# Patient Record
Sex: FEMALE | Race: BLACK OR AFRICAN AMERICAN | NOT HISPANIC OR LATINO | Employment: UNEMPLOYED | URBAN - NONMETROPOLITAN AREA
[De-identification: names, ages, dates, MRNs, and addresses within clinical notes are randomized per-mention and may not be internally consistent; named-entity substitution may affect disease eponyms.]

---
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
## 2023-02-27 ENCOUNTER — TRANSCRIBE ORDERS (OUTPATIENT)
Dept: ADMINISTRATIVE | Facility: HOSPITAL | Age: 18
End: 2023-02-27
Payer: COMMERCIAL

## 2023-02-27 DIAGNOSIS — C71.9 PILOCYTIC ASTROCYTOMA: Primary | ICD-10-CM

## 2023-03-02 ENCOUNTER — REFERRAL TRIAGE (OUTPATIENT)
Dept: LABOR AND DELIVERY | Facility: HOSPITAL | Age: 18
End: 2023-03-02
Payer: COMMERCIAL

## 2023-03-02 ENCOUNTER — INITIAL PRENATAL (OUTPATIENT)
Dept: OBSTETRICS AND GYNECOLOGY | Facility: CLINIC | Age: 18
End: 2023-03-02
Payer: COMMERCIAL

## 2023-03-02 ENCOUNTER — PATIENT OUTREACH (OUTPATIENT)
Dept: LABOR AND DELIVERY | Facility: HOSPITAL | Age: 18
End: 2023-03-02
Payer: COMMERCIAL

## 2023-03-02 DIAGNOSIS — Z78.9 NON-SMOKER: ICD-10-CM

## 2023-03-02 DIAGNOSIS — M54.9 BACK PAIN IN PREGNANCY: ICD-10-CM

## 2023-03-02 DIAGNOSIS — O26.899 PREGNANCY RELATED HIP PAIN, ANTEPARTUM: ICD-10-CM

## 2023-03-02 DIAGNOSIS — M25.559 PREGNANCY RELATED HIP PAIN, ANTEPARTUM: ICD-10-CM

## 2023-03-02 DIAGNOSIS — Z71.85 IMMUNIZATION COUNSELING: ICD-10-CM

## 2023-03-02 DIAGNOSIS — Z3A.23 23 WEEKS GESTATION OF PREGNANCY: ICD-10-CM

## 2023-03-02 DIAGNOSIS — O99.891 BACK PAIN IN PREGNANCY: ICD-10-CM

## 2023-03-02 DIAGNOSIS — G40.909 SEIZURE DISORDER DURING PREGNANCY, ANTEPARTUM: ICD-10-CM

## 2023-03-02 DIAGNOSIS — Z23 INFLUENZA VACCINE NEEDED: ICD-10-CM

## 2023-03-02 DIAGNOSIS — Z34.82 PRENATAL CARE, SUBSEQUENT PREGNANCY, SECOND TRIMESTER: Primary | ICD-10-CM

## 2023-03-02 DIAGNOSIS — O99.350 SEIZURE DISORDER DURING PREGNANCY, ANTEPARTUM: ICD-10-CM
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
## 2023-03-02 PROBLEM — Z92.21 PERSONAL HISTORY OF CHEMOTHERAPY: Status: ACTIVE | Noted: 2022-12-06

## 2023-03-02 PROBLEM — I67.89 RADIATION THERAPY INDUCED BRAIN NECROSIS: Status: ACTIVE | Noted: 2022-07-31

## 2023-03-02 LAB
GLUCOSE UR STRIP-MCNC: NEGATIVE MG/DL
PROT UR STRIP-MCNC: NEGATIVE MG/DL

## 2023-03-02 PROCEDURE — 90471 IMMUNIZATION ADMIN: CPT | Performed by: ADVANCED PRACTICE MIDWIFE

## 2023-03-02 PROCEDURE — 90686 IIV4 VACC NO PRSV 0.5 ML IM: CPT | Performed by: ADVANCED PRACTICE MIDWIFE

## 2023-03-02 PROCEDURE — 99204 OFFICE O/P NEW MOD 45 MIN: CPT | Performed by: ADVANCED PRACTICE MIDWIFE

## 2023-03-02 RX ORDER — LEVETIRACETAM 1000 MG/1
TABLET ORAL
COMMUNITY
Start: 2022-11-11

## 2023-03-02 RX ORDER — CLONAZEPAM 0.5 MG/1
TABLET ORAL AS NEEDED
COMMUNITY
Start: 2022-11-14

## 2023-03-02 RX ORDER — DIAZEPAM 20 MG/4ML
GEL RECTAL
COMMUNITY
Start: 2023-02-16

## 2023-03-02 RX ORDER — NALOXONE HYDROCHLORIDE 4 MG/.1ML
SPRAY NASAL
COMMUNITY
Start: 2022-08-18 | End: 2023-08-19

## 2023-03-02 RX ORDER — LEVETIRACETAM 500 MG/1
TABLET ORAL
COMMUNITY
Start: 2022-11-16

## 2023-03-02 RX ORDER — DIAZEPAM 7.5 MG/100UL
SPRAY NASAL
COMMUNITY
Start: 2023-02-17

## 2023-03-02 RX ORDER — OXCARBAZEPINE 600 MG/1
TABLET, FILM COATED ORAL
COMMUNITY
Start: 2022-08-30

## 2023-03-06 VITALS
SYSTOLIC BLOOD PRESSURE: 122 MMHG | WEIGHT: 184 LBS | HEIGHT: 64 IN | BODY MASS INDEX: 31.41 KG/M2 | DIASTOLIC BLOOD PRESSURE: 64 MMHG

## 2023-03-06 PROBLEM — Z34.90 PREGNANCY: Status: ACTIVE | Noted: 2023-03-06

## 2023-03-08 LAB
BACTERIA UR CULT: NORMAL
BACTERIA UR CULT: NORMAL
C TRACH RRNA SPEC QL NAA+PROBE: NEGATIVE
DRUGS UR: NORMAL
N GONORRHOEA RRNA SPEC QL NAA+PROBE: NEGATIVE

## 2023-03-27 ENCOUNTER — PATIENT OUTREACH (OUTPATIENT)
Dept: LABOR AND DELIVERY | Facility: HOSPITAL | Age: 18
End: 2023-03-27
Payer: COMMERCIAL

## 2023-03-29 ENCOUNTER — ROUTINE PRENATAL (OUTPATIENT)
Dept: OBSTETRICS AND GYNECOLOGY | Facility: CLINIC | Age: 18
End: 2023-03-29
Payer: COMMERCIAL

## 2023-03-29 VITALS — WEIGHT: 189 LBS | DIASTOLIC BLOOD PRESSURE: 70 MMHG | SYSTOLIC BLOOD PRESSURE: 120 MMHG | BODY MASS INDEX: 32.44 KG/M2

## 2023-03-29 DIAGNOSIS — Z34.82 MULTIGRAVIDA IN SECOND TRIMESTER: Primary | ICD-10-CM

## 2023-03-29 DIAGNOSIS — Z13.0 SCREENING FOR IRON DEFICIENCY ANEMIA: ICD-10-CM

## 2023-03-29 DIAGNOSIS — Z23 NEED FOR TDAP VACCINATION: ICD-10-CM

## 2023-03-29 DIAGNOSIS — Z13.1 SCREENING FOR DIABETES MELLITUS (DM): ICD-10-CM

## 2023-03-29 DIAGNOSIS — Z3A.27 27 WEEKS GESTATION OF PREGNANCY: ICD-10-CM

## 2023-03-29 LAB
GLUCOSE UR STRIP-MCNC: NEGATIVE MG/DL
PROT UR STRIP-MCNC: ABNORMAL MG/DL

## 2023-03-29 PROCEDURE — 90471 IMMUNIZATION ADMIN: CPT | Performed by: ADVANCED PRACTICE MIDWIFE

## 2023-03-29 PROCEDURE — 99214 OFFICE O/P EST MOD 30 MIN: CPT | Performed by: ADVANCED PRACTICE MIDWIFE

## 2023-03-29 PROCEDURE — 90715 TDAP VACCINE 7 YRS/> IM: CPT | Performed by: ADVANCED PRACTICE MIDWIFE

## 2023-03-30 LAB
GLUCOSE 1H P 50 G GLC PO SERPL-MCNC: 114 MG/DL (ref 65–139)
HGB BLD-MCNC: 10.8 G/DL (ref 12–15.9)

## 2023-04-11 ENCOUNTER — HOSPITAL ENCOUNTER (OUTPATIENT)
Dept: MRI IMAGING | Facility: HOSPITAL | Age: 18
Discharge: HOME | End: 2023-04-11
Admitting: PEDIATRICS
Payer: COMMERCIAL

## 2023-04-11 DIAGNOSIS — C71.9 PILOCYTIC ASTROCYTOMA: ICD-10-CM

## 2023-04-11 PROCEDURE — 70551 MRI BRAIN STEM W/O DYE: CPT

## 2023-04-18 ENCOUNTER — ROUTINE PRENATAL (OUTPATIENT)
Dept: OBSTETRICS AND GYNECOLOGY | Facility: CLINIC | Age: 18
End: 2023-04-18
Payer: COMMERCIAL

## 2023-04-18 VITALS — BODY MASS INDEX: 31.78 KG/M2 | SYSTOLIC BLOOD PRESSURE: 114 MMHG | DIASTOLIC BLOOD PRESSURE: 68 MMHG | WEIGHT: 191 LBS

## 2023-04-18 DIAGNOSIS — N39.0 URINARY TRACT INFECTION WITHOUT HEMATURIA, SITE UNSPECIFIED: Primary | ICD-10-CM

## 2023-04-18 LAB
GLUCOSE UR STRIP-MCNC: NEGATIVE MG/DL
PROT UR STRIP-MCNC: NEGATIVE MG/DL

## 2023-04-18 PROCEDURE — 99213 OFFICE O/P EST LOW 20 MIN: CPT | Performed by: OBSTETRICS & GYNECOLOGY

## 2023-04-18 RX ORDER — NITROFURANTOIN 25; 75 MG/1; MG/1
100 CAPSULE ORAL DAILY
Qty: 30 CAPSULE | Refills: 2 | Status: SHIPPED | OUTPATIENT
Start: 2023-04-18

## 2023-04-18 RX ORDER — SULFAMETHOXAZOLE AND TRIMETHOPRIM 400; 80 MG/1; MG/1
1 TABLET ORAL 2 TIMES DAILY
Qty: 14 TABLET | Refills: 0 | Status: SHIPPED | OUTPATIENT
Start: 2023-04-18

## 2023-04-22 LAB
APPEARANCE UR: ABNORMAL
BACTERIA #/AREA URNS HPF: ABNORMAL /HPF
BACTERIA UR CULT: ABNORMAL
BACTERIA UR CULT: ABNORMAL
BILIRUB UR QL STRIP: NEGATIVE
CASTS URNS MICRO: ABNORMAL
COLOR UR: ABNORMAL
EPI CELLS #/AREA URNS HPF: ABNORMAL /HPF
GLUCOSE UR QL STRIP: NEGATIVE
HGB UR QL STRIP: NEGATIVE
KETONES UR QL STRIP: NEGATIVE
LEUKOCYTE ESTERASE UR QL STRIP: ABNORMAL
NITRITE UR QL STRIP: NEGATIVE
PH UR STRIP: 8 [PH] (ref 5–8)
PROT UR QL STRIP: ABNORMAL
RBC #/AREA URNS HPF: ABNORMAL /HPF
SP GR UR STRIP: 1.02 (ref 1–1.03)
UROBILINOGEN UR STRIP-MCNC: ABNORMAL MG/DL
WBC #/AREA URNS HPF: ABNORMAL /HPF

## 2023-05-03 ENCOUNTER — ROUTINE PRENATAL (OUTPATIENT)
Dept: OBSTETRICS AND GYNECOLOGY | Facility: CLINIC | Age: 18
End: 2023-05-03
Payer: COMMERCIAL

## 2023-05-03 VITALS — SYSTOLIC BLOOD PRESSURE: 124 MMHG | WEIGHT: 195 LBS | BODY MASS INDEX: 32.45 KG/M2 | DIASTOLIC BLOOD PRESSURE: 78 MMHG

## 2023-05-03 DIAGNOSIS — Z34.93 PRENATAL CARE IN THIRD TRIMESTER: Primary | ICD-10-CM

## 2023-05-03 LAB
GLUCOSE UR STRIP-MCNC: NEGATIVE MG/DL
PROT UR STRIP-MCNC: NEGATIVE MG/DL

## 2023-05-12 ENCOUNTER — HOSPITAL ENCOUNTER (OUTPATIENT)
Facility: HOSPITAL | Age: 18
Setting detail: OBSERVATION
Discharge: HOME | End: 2023-05-12
Attending: OBSTETRICS & GYNECOLOGY | Admitting: OBSTETRICS & GYNECOLOGY
Payer: COMMERCIAL

## 2023-05-12 VITALS
HEART RATE: 113 BPM | SYSTOLIC BLOOD PRESSURE: 103 MMHG | WEIGHT: 197.8 LBS | DIASTOLIC BLOOD PRESSURE: 50 MMHG | BODY MASS INDEX: 38.83 KG/M2 | RESPIRATION RATE: 16 BRPM | TEMPERATURE: 99.6 F | HEIGHT: 60 IN

## 2023-05-12 LAB
BACTERIA UR QL AUTO: ABNORMAL /HPF
BILIRUB UR QL STRIP: NEGATIVE
CLARITY UR: CLEAR
COLOR UR: ABNORMAL
GLUCOSE UR STRIP-MCNC: NEGATIVE MG/DL
HGB UR QL STRIP.AUTO: NEGATIVE
HYALINE CASTS UR QL AUTO: ABNORMAL /LPF
KETONES UR QL STRIP: ABNORMAL
LEUKOCYTE ESTERASE UR QL STRIP.AUTO: ABNORMAL
NITRITE UR QL STRIP: NEGATIVE
PH UR STRIP.AUTO: 6 [PH] (ref 5–8)
PROT UR QL STRIP: NEGATIVE
RBC # UR STRIP: ABNORMAL /HPF
REF LAB TEST METHOD: ABNORMAL
SP GR UR STRIP: 1.02 (ref 1–1.03)
SQUAMOUS #/AREA URNS HPF: ABNORMAL /HPF
UROBILINOGEN UR QL STRIP: ABNORMAL
WBC # UR STRIP: ABNORMAL /HPF

## 2023-05-12 PROCEDURE — G0463 HOSPITAL OUTPT CLINIC VISIT: HCPCS

## 2023-05-12 PROCEDURE — 81001 URINALYSIS AUTO W/SCOPE: CPT | Performed by: OBSTETRICS & GYNECOLOGY

## 2023-05-12 PROCEDURE — G0378 HOSPITAL OBSERVATION PER HR: HCPCS

## 2023-05-12 RX ORDER — SODIUM CHLORIDE 0.9 % (FLUSH) 0.9 %
10 SYRINGE (ML) INJECTION AS NEEDED
Status: DISCONTINUED | OUTPATIENT
Start: 2023-05-12 | End: 2023-05-12 | Stop reason: HOSPADM

## 2023-05-12 RX ORDER — NIFEDIPINE 10 MG/1
10 CAPSULE ORAL ONCE
Status: COMPLETED | OUTPATIENT
Start: 2023-05-12 | End: 2023-05-12

## 2023-05-12 RX ORDER — SODIUM CHLORIDE 0.9 % (FLUSH) 0.9 %
10 SYRINGE (ML) INJECTION EVERY 12 HOURS SCHEDULED
Status: DISCONTINUED | OUTPATIENT
Start: 2023-05-12 | End: 2023-05-12 | Stop reason: HOSPADM

## 2023-05-12 RX ADMIN — SODIUM CHLORIDE, POTASSIUM CHLORIDE, SODIUM LACTATE AND CALCIUM CHLORIDE 1000 ML: 600; 310; 30; 20 INJECTION, SOLUTION INTRAVENOUS at 03:20

## 2023-05-12 RX ADMIN — NIFEDIPINE 10 MG: 10 CAPSULE ORAL at 06:48

## 2023-05-13 ENCOUNTER — NURSE TRIAGE (OUTPATIENT)
Dept: CALL CENTER | Facility: HOSPITAL | Age: 18
End: 2023-05-13
Payer: COMMERCIAL

## 2023-05-17 ENCOUNTER — PATIENT OUTREACH (OUTPATIENT)
Dept: LABOR AND DELIVERY | Facility: HOSPITAL | Age: 18
End: 2023-05-17
Payer: COMMERCIAL

## 2023-05-18 ENCOUNTER — ROUTINE PRENATAL (OUTPATIENT)
Dept: OBSTETRICS AND GYNECOLOGY | Facility: CLINIC | Age: 18
End: 2023-05-18
Payer: COMMERCIAL

## 2023-05-18 VITALS — DIASTOLIC BLOOD PRESSURE: 74 MMHG | SYSTOLIC BLOOD PRESSURE: 112 MMHG | BODY MASS INDEX: 38.47 KG/M2 | WEIGHT: 197 LBS

## 2023-05-18 DIAGNOSIS — G40.909 SEIZURE DISORDER DURING PREGNANCY, ANTEPARTUM: ICD-10-CM

## 2023-05-18 DIAGNOSIS — O99.350 SEIZURE DISORDER DURING PREGNANCY, ANTEPARTUM: ICD-10-CM

## 2023-05-18 DIAGNOSIS — Z34.93 PRENATAL CARE IN THIRD TRIMESTER: Primary | ICD-10-CM

## 2023-05-18 LAB
GLUCOSE UR STRIP-MCNC: NEGATIVE MG/DL
PROT UR STRIP-MCNC: ABNORMAL MG/DL

## 2023-05-29 ENCOUNTER — PATIENT OUTREACH (OUTPATIENT)
Dept: LABOR AND DELIVERY | Facility: HOSPITAL | Age: 18
End: 2023-05-29

## 2023-05-31 ENCOUNTER — ROUTINE PRENATAL (OUTPATIENT)
Dept: OBSTETRICS AND GYNECOLOGY | Facility: CLINIC | Age: 18
End: 2023-05-31

## 2023-05-31 VITALS — DIASTOLIC BLOOD PRESSURE: 82 MMHG | BODY MASS INDEX: 39.65 KG/M2 | WEIGHT: 203 LBS | SYSTOLIC BLOOD PRESSURE: 122 MMHG

## 2023-05-31 DIAGNOSIS — G40.909 SEIZURE DISORDER DURING PREGNANCY, ANTEPARTUM: ICD-10-CM

## 2023-05-31 DIAGNOSIS — Z34.93 PRENATAL CARE IN THIRD TRIMESTER: Primary | ICD-10-CM

## 2023-05-31 DIAGNOSIS — O99.350 SEIZURE DISORDER DURING PREGNANCY, ANTEPARTUM: ICD-10-CM

## 2023-06-03 LAB
C TRACH RRNA SPEC QL NAA+PROBE: NEGATIVE
GP B STREP DNA SPEC QL NAA+PROBE: NEGATIVE
N GONORRHOEA RRNA SPEC QL NAA+PROBE: NEGATIVE

## 2023-06-06 ENCOUNTER — ROUTINE PRENATAL (OUTPATIENT)
Dept: OBSTETRICS AND GYNECOLOGY | Facility: CLINIC | Age: 18
End: 2023-06-06
Payer: COMMERCIAL

## 2023-06-06 VITALS — DIASTOLIC BLOOD PRESSURE: 82 MMHG | BODY MASS INDEX: 40.04 KG/M2 | SYSTOLIC BLOOD PRESSURE: 116 MMHG | WEIGHT: 205 LBS

## 2023-06-06 DIAGNOSIS — O99.350 SEIZURE DISORDER DURING PREGNANCY, ANTEPARTUM: ICD-10-CM

## 2023-06-06 DIAGNOSIS — Z3A.37 37 WEEKS GESTATION OF PREGNANCY: Primary | ICD-10-CM

## 2023-06-06 DIAGNOSIS — G40.909 SEIZURE DISORDER DURING PREGNANCY, ANTEPARTUM: ICD-10-CM

## 2023-06-06 LAB
GLUCOSE UR STRIP-MCNC: NEGATIVE MG/DL
PROT UR STRIP-MCNC: NEGATIVE MG/DL

## 2023-06-10 ENCOUNTER — HOSPITAL ENCOUNTER (OUTPATIENT)
Facility: HOSPITAL | Age: 18
Setting detail: OBSERVATION
Discharge: HOME | End: 2023-06-10
Attending: OBSTETRICS & GYNECOLOGY | Admitting: OBSTETRICS & GYNECOLOGY
Payer: COMMERCIAL

## 2023-06-10 VITALS
BODY MASS INDEX: 35.29 KG/M2 | WEIGHT: 211.8 LBS | DIASTOLIC BLOOD PRESSURE: 87 MMHG | HEIGHT: 65 IN | TEMPERATURE: 97 F | HEART RATE: 79 BPM | RESPIRATION RATE: 16 BRPM | SYSTOLIC BLOOD PRESSURE: 136 MMHG

## 2023-06-10 PROCEDURE — G0378 HOSPITAL OBSERVATION PER HR: HCPCS

## 2023-06-10 PROCEDURE — 96372 THER/PROPH/DIAG INJ SC/IM: CPT

## 2023-06-10 PROCEDURE — 25010000002 MORPHINE PER 10 MG: Performed by: OBSTETRICS & GYNECOLOGY

## 2023-06-10 PROCEDURE — G0463 HOSPITAL OUTPT CLINIC VISIT: HCPCS

## 2023-06-10 RX ORDER — MORPHINE SULFATE 10 MG/ML
10 INJECTION INTRAMUSCULAR; INTRAVENOUS; SUBCUTANEOUS ONCE
Status: COMPLETED | OUTPATIENT
Start: 2023-06-10 | End: 2023-06-10

## 2023-06-10 RX ADMIN — MORPHINE SULFATE 10 MG: 10 INJECTION INTRAVENOUS at 22:35

## 2023-06-11 ENCOUNTER — HOSPITAL ENCOUNTER (OUTPATIENT)
Facility: HOSPITAL | Age: 18
Discharge: HOME | End: 2023-06-11
Attending: OBSTETRICS & GYNECOLOGY | Admitting: OBSTETRICS & GYNECOLOGY
Payer: COMMERCIAL

## 2023-06-11 VITALS
HEART RATE: 80 BPM | HEIGHT: 65 IN | BODY MASS INDEX: 35.16 KG/M2 | RESPIRATION RATE: 18 BRPM | DIASTOLIC BLOOD PRESSURE: 72 MMHG | WEIGHT: 211 LBS | TEMPERATURE: 98.7 F | SYSTOLIC BLOOD PRESSURE: 120 MMHG

## 2023-06-11 PROCEDURE — G0463 HOSPITAL OUTPT CLINIC VISIT: HCPCS

## 2023-06-11 PROCEDURE — G0378 HOSPITAL OBSERVATION PER HR: HCPCS

## 2023-06-11 RX ORDER — SODIUM CHLORIDE 0.9 % (FLUSH) 0.9 %
10 SYRINGE (ML) INJECTION AS NEEDED
Status: DISCONTINUED | OUTPATIENT
Start: 2023-06-11 | End: 2023-06-12 | Stop reason: HOSPADM

## 2023-06-11 RX ORDER — SODIUM CHLORIDE 0.9 % (FLUSH) 0.9 %
10 SYRINGE (ML) INJECTION EVERY 12 HOURS SCHEDULED
Status: DISCONTINUED | OUTPATIENT
Start: 2023-06-11 | End: 2023-06-12 | Stop reason: HOSPADM

## 2023-06-11 RX ADMIN — SODIUM CHLORIDE, POTASSIUM CHLORIDE, SODIUM LACTATE AND CALCIUM CHLORIDE 1000 ML: 600; 310; 30; 20 INJECTION, SOLUTION INTRAVENOUS at 21:30

## 2023-06-12 ENCOUNTER — HOSPITAL ENCOUNTER (INPATIENT)
Facility: HOSPITAL | Age: 18
LOS: 2 days | Discharge: HOME | End: 2023-06-15
Attending: OBSTETRICS & GYNECOLOGY | Admitting: OBSTETRICS & GYNECOLOGY
Payer: COMMERCIAL

## 2023-06-12 DIAGNOSIS — G89.18 PAIN FOLLOWING CESAREAN DELIVERY: Primary | ICD-10-CM

## 2023-06-13 ENCOUNTER — ANESTHESIA EVENT (OUTPATIENT)
Dept: LABOR AND DELIVERY | Facility: HOSPITAL | Age: 18
End: 2023-06-13
Payer: COMMERCIAL

## 2023-06-13 ENCOUNTER — ANESTHESIA (OUTPATIENT)
Dept: LABOR AND DELIVERY | Facility: HOSPITAL | Age: 18
End: 2023-06-13
Payer: COMMERCIAL

## 2023-06-13 PROBLEM — Z34.90 PREGNANCY: Status: RESOLVED | Noted: 2023-03-06 | Resolved: 2023-06-13

## 2023-06-13 PROBLEM — Z37.9 NORMAL LABOR: Status: RESOLVED | Noted: 2023-06-13 | Resolved: 2023-06-13

## 2023-06-13 PROBLEM — Z37.9 NORMAL LABOR: Status: ACTIVE | Noted: 2023-06-13

## 2023-06-13 LAB
ABO GROUP BLD: NORMAL
BASOPHILS # BLD AUTO: 0.04 10*3/MM3 (ref 0–0.2)
BASOPHILS NFR BLD AUTO: 0.2 % (ref 0–1.5)
BLD GP AB SCN SERPL QL: NEGATIVE
DEPRECATED RDW RBC AUTO: 43.2 FL (ref 37–54)
DEPRECATED RDW RBC AUTO: 44 FL (ref 37–54)
EOSINOPHIL # BLD AUTO: 0 10*3/MM3 (ref 0–0.4)
EOSINOPHIL NFR BLD AUTO: 0 % (ref 0.3–6.2)
ERYTHROCYTE [DISTWIDTH] IN BLOOD BY AUTOMATED COUNT: 13.6 % (ref 12.3–15.4)
ERYTHROCYTE [DISTWIDTH] IN BLOOD BY AUTOMATED COUNT: 13.6 % (ref 12.3–15.4)
HCT VFR BLD AUTO: 30.7 % (ref 34–46.6)
HCT VFR BLD AUTO: 31.3 % (ref 34–46.6)
HGB BLD-MCNC: 9.5 G/DL (ref 12–15.9)
HGB BLD-MCNC: 9.8 G/DL (ref 12–15.9)
IMM GRANULOCYTES # BLD AUTO: 0.16 10*3/MM3 (ref 0–0.05)
IMM GRANULOCYTES NFR BLD AUTO: 0.7 % (ref 0–0.5)
LYMPHOCYTES # BLD AUTO: 0.81 10*3/MM3 (ref 0.7–3.1)
LYMPHOCYTES NFR BLD AUTO: 3.4 % (ref 19.6–45.3)
MCH RBC QN AUTO: 27.1 PG (ref 26.6–33)
MCH RBC QN AUTO: 28 PG (ref 26.6–33)
MCHC RBC AUTO-ENTMCNC: 30.4 G/DL (ref 31.5–35.7)
MCHC RBC AUTO-ENTMCNC: 31.9 G/DL (ref 31.5–35.7)
MCV RBC AUTO: 87.7 FL (ref 79–97)
MCV RBC AUTO: 89.4 FL (ref 79–97)
MONOCYTES # BLD AUTO: 0.96 10*3/MM3 (ref 0.1–0.9)
MONOCYTES NFR BLD AUTO: 4.1 % (ref 5–12)
NEUTROPHILS NFR BLD AUTO: 21.51 10*3/MM3 (ref 1.7–7)
NEUTROPHILS NFR BLD AUTO: 91.6 % (ref 42.7–76)
NRBC BLD AUTO-RTO: 0 /100 WBC (ref 0–0.2)
PLATELET # BLD AUTO: 285 10*3/MM3 (ref 140–450)
PLATELET # BLD AUTO: 309 10*3/MM3 (ref 140–450)
PMV BLD AUTO: 10.2 FL (ref 6–12)
PMV BLD AUTO: 10.3 FL (ref 6–12)
RBC # BLD AUTO: 3.5 10*6/MM3 (ref 3.77–5.28)
RBC # BLD AUTO: 3.5 10*6/MM3 (ref 3.77–5.28)
RH BLD: POSITIVE
T&S EXPIRATION DATE: NORMAL
WBC NRBC COR # BLD: 21.92 10*3/MM3 (ref 3.4–10.8)
WBC NRBC COR # BLD: 23.48 10*3/MM3 (ref 3.4–10.8)

## 2023-06-13 PROCEDURE — 25010000002 METOCLOPRAMIDE PER 10 MG: Performed by: OBSTETRICS & GYNECOLOGY

## 2023-06-13 PROCEDURE — 86901 BLOOD TYPING SEROLOGIC RH(D): CPT | Performed by: OBSTETRICS & GYNECOLOGY

## 2023-06-13 PROCEDURE — 86850 RBC ANTIBODY SCREEN: CPT | Performed by: OBSTETRICS & GYNECOLOGY

## 2023-06-13 PROCEDURE — 25010000002 BUTORPHANOL PER 1 MG: Performed by: OBSTETRICS & GYNECOLOGY

## 2023-06-13 PROCEDURE — 25010000002 KETOROLAC TROMETHAMINE PER 15 MG: Performed by: NURSE ANESTHETIST, CERTIFIED REGISTERED

## 2023-06-13 PROCEDURE — 25010000002 ONDANSETRON PER 1 MG: Performed by: NURSE ANESTHETIST, CERTIFIED REGISTERED

## 2023-06-13 PROCEDURE — 0 HYDROMORPHONE 1 MG/ML SOLUTION: Performed by: OBSTETRICS & GYNECOLOGY

## 2023-06-13 PROCEDURE — 25010000002 PROPOFOL 10 MG/ML EMULSION: Performed by: NURSE ANESTHETIST, CERTIFIED REGISTERED

## 2023-06-13 PROCEDURE — S0260 H&P FOR SURGERY: HCPCS | Performed by: OBSTETRICS & GYNECOLOGY

## 2023-06-13 PROCEDURE — 25010000002 GENTAMICIN PER 80 MG: Performed by: OBSTETRICS & GYNECOLOGY

## 2023-06-13 PROCEDURE — 25010000002 DEXAMETHASONE PER 1 MG: Performed by: NURSE ANESTHETIST, CERTIFIED REGISTERED

## 2023-06-13 PROCEDURE — 59515 CESAREAN DELIVERY: CPT | Performed by: OBSTETRICS & GYNECOLOGY

## 2023-06-13 PROCEDURE — 25010000002 FENTANYL CITRATE (PF) 250 MCG/5ML SOLUTION: Performed by: NURSE ANESTHETIST, CERTIFIED REGISTERED

## 2023-06-13 PROCEDURE — 88307 TISSUE EXAM BY PATHOLOGIST: CPT | Performed by: OBSTETRICS & GYNECOLOGY

## 2023-06-13 PROCEDURE — 86900 BLOOD TYPING SEROLOGIC ABO: CPT | Performed by: OBSTETRICS & GYNECOLOGY

## 2023-06-13 PROCEDURE — 25010000002 OXYTOCIN PER 10 UNITS: Performed by: NURSE ANESTHETIST, CERTIFIED REGISTERED

## 2023-06-13 PROCEDURE — 85027 COMPLETE CBC AUTOMATED: CPT | Performed by: OBSTETRICS & GYNECOLOGY

## 2023-06-13 PROCEDURE — 85025 COMPLETE CBC W/AUTO DIFF WBC: CPT | Performed by: OBSTETRICS & GYNECOLOGY

## 2023-06-13 PROCEDURE — 51702 INSERT TEMP BLADDER CATH: CPT

## 2023-06-13 PROCEDURE — 25010000002 HYDROMORPHONE 1 MG/ML SOLUTION: Performed by: NURSE ANESTHETIST, CERTIFIED REGISTERED

## 2023-06-13 PROCEDURE — 25010000002 METOCLOPRAMIDE PER 10 MG: Performed by: NURSE ANESTHETIST, CERTIFIED REGISTERED

## 2023-06-13 PROCEDURE — 25010000002 KETOROLAC TROMETHAMINE PER 15 MG: Performed by: OBSTETRICS & GYNECOLOGY

## 2023-06-13 DEVICE — HEMOST ABS SURGICEL PWDR 3GM: Type: IMPLANTABLE DEVICE | Site: ABDOMEN | Status: FUNCTIONAL

## 2023-06-13 RX ORDER — ONDANSETRON 2 MG/ML
INJECTION INTRAMUSCULAR; INTRAVENOUS AS NEEDED
Status: DISCONTINUED | OUTPATIENT
Start: 2023-06-13 | End: 2023-06-13 | Stop reason: SURG

## 2023-06-13 RX ORDER — SODIUM CHLORIDE, SODIUM LACTATE, POTASSIUM CHLORIDE, CALCIUM CHLORIDE 600; 310; 30; 20 MG/100ML; MG/100ML; MG/100ML; MG/100ML
125 INJECTION, SOLUTION INTRAVENOUS CONTINUOUS
Status: DISCONTINUED | OUTPATIENT
Start: 2023-06-13 | End: 2023-06-13

## 2023-06-13 RX ORDER — PROMETHAZINE HYDROCHLORIDE 12.5 MG/1
12.5 SUPPOSITORY RECTAL EVERY 6 HOURS PRN
Status: DISCONTINUED | OUTPATIENT
Start: 2023-06-13 | End: 2023-06-15 | Stop reason: HOSPADM

## 2023-06-13 RX ORDER — BUTORPHANOL TARTRATE 1 MG/ML
1 INJECTION, SOLUTION INTRAMUSCULAR; INTRAVENOUS
Status: DISCONTINUED | OUTPATIENT
Start: 2023-06-13 | End: 2023-06-13 | Stop reason: HOSPADM

## 2023-06-13 RX ORDER — MORPHINE SULFATE 2 MG/ML
2 INJECTION, SOLUTION INTRAMUSCULAR; INTRAVENOUS
Status: DISCONTINUED | OUTPATIENT
Start: 2023-06-13 | End: 2023-06-15 | Stop reason: HOSPADM

## 2023-06-13 RX ORDER — CLONAZEPAM 0.5 MG/1
0.5 TABLET ORAL 2 TIMES DAILY PRN
Status: DISCONTINUED | OUTPATIENT
Start: 2023-06-13 | End: 2023-06-13

## 2023-06-13 RX ORDER — IBUPROFEN 600 MG/1
600 TABLET ORAL EVERY 6 HOURS
Status: DISCONTINUED | OUTPATIENT
Start: 2023-06-14 | End: 2023-06-15 | Stop reason: HOSPADM

## 2023-06-13 RX ORDER — LEVETIRACETAM 500 MG/1
1500 TABLET ORAL 2 TIMES DAILY
Status: DISCONTINUED | OUTPATIENT
Start: 2023-06-13 | End: 2023-06-13

## 2023-06-13 RX ORDER — SODIUM CHLORIDE 9 MG/ML
40 INJECTION, SOLUTION INTRAVENOUS AS NEEDED
Status: DISCONTINUED | OUTPATIENT
Start: 2023-06-13 | End: 2023-06-15 | Stop reason: HOSPADM

## 2023-06-13 RX ORDER — TRISODIUM CITRATE DIHYDRATE AND CITRIC ACID MONOHYDRATE 500; 334 MG/5ML; MG/5ML
30 SOLUTION ORAL ONCE AS NEEDED
Status: COMPLETED | OUTPATIENT
Start: 2023-06-13 | End: 2023-06-13

## 2023-06-13 RX ORDER — BUTORPHANOL TARTRATE 1 MG/ML
0.5 INJECTION, SOLUTION INTRAMUSCULAR; INTRAVENOUS EVERY 6 HOURS PRN
Status: DISCONTINUED | OUTPATIENT
Start: 2023-06-13 | End: 2023-06-15 | Stop reason: HOSPADM

## 2023-06-13 RX ORDER — CARBOPROST TROMETHAMINE 250 UG/ML
250 INJECTION, SOLUTION INTRAMUSCULAR AS NEEDED
Status: DISCONTINUED | OUTPATIENT
Start: 2023-06-13 | End: 2023-06-13 | Stop reason: HOSPADM

## 2023-06-13 RX ORDER — TRISODIUM CITRATE DIHYDRATE AND CITRIC ACID MONOHYDRATE 500; 334 MG/5ML; MG/5ML
15 SOLUTION ORAL ONCE
Status: COMPLETED | OUTPATIENT
Start: 2023-06-13 | End: 2023-06-13

## 2023-06-13 RX ORDER — OXYCODONE AND ACETAMINOPHEN 7.5; 325 MG/1; MG/1
2 TABLET ORAL EVERY 4 HOURS PRN
Status: ACTIVE | OUTPATIENT
Start: 2023-06-13 | End: 2023-06-14

## 2023-06-13 RX ORDER — OXYTOCIN/0.9 % SODIUM CHLORIDE 30/500 ML
PLASTIC BAG, INJECTION (ML) INTRAVENOUS CONTINUOUS PRN
Status: DISCONTINUED | OUTPATIENT
Start: 2023-06-13 | End: 2023-06-13 | Stop reason: SURG

## 2023-06-13 RX ORDER — SODIUM CHLORIDE 0.9 % (FLUSH) 0.9 %
3-10 SYRINGE (ML) INJECTION AS NEEDED
Status: DISCONTINUED | OUTPATIENT
Start: 2023-06-13 | End: 2023-06-15 | Stop reason: HOSPADM

## 2023-06-13 RX ORDER — SUCCINYLCHOLINE/SOD CL,ISO/PF 200MG/10ML
SYRINGE (ML) INTRAVENOUS AS NEEDED
Status: DISCONTINUED | OUTPATIENT
Start: 2023-06-13 | End: 2023-06-13 | Stop reason: SURG

## 2023-06-13 RX ORDER — MISOPROSTOL 200 UG/1
800 TABLET ORAL ONCE
Status: DISCONTINUED | OUTPATIENT
Start: 2023-06-13 | End: 2023-06-13 | Stop reason: HOSPADM

## 2023-06-13 RX ORDER — PROPOFOL 10 MG/ML
VIAL (ML) INTRAVENOUS AS NEEDED
Status: DISCONTINUED | OUTPATIENT
Start: 2023-06-13 | End: 2023-06-13 | Stop reason: SURG

## 2023-06-13 RX ORDER — METOCLOPRAMIDE HYDROCHLORIDE 5 MG/ML
10 INJECTION INTRAMUSCULAR; INTRAVENOUS ONCE
Status: COMPLETED | OUTPATIENT
Start: 2023-06-13 | End: 2023-06-13

## 2023-06-13 RX ORDER — SODIUM CHLORIDE 0.9 % (FLUSH) 0.9 %
1-10 SYRINGE (ML) INJECTION AS NEEDED
Status: DISCONTINUED | OUTPATIENT
Start: 2023-06-13 | End: 2023-06-13 | Stop reason: HOSPADM

## 2023-06-13 RX ORDER — ONDANSETRON 2 MG/ML
4 INJECTION INTRAMUSCULAR; INTRAVENOUS EVERY 6 HOURS PRN
Status: DISCONTINUED | OUTPATIENT
Start: 2023-06-13 | End: 2023-06-15 | Stop reason: HOSPADM

## 2023-06-13 RX ORDER — OXYTOCIN 10 [USP'U]/ML
INJECTION, SOLUTION INTRAMUSCULAR; INTRAVENOUS AS NEEDED
Status: DISCONTINUED | OUTPATIENT
Start: 2023-06-13 | End: 2023-06-13 | Stop reason: SURG

## 2023-06-13 RX ORDER — ONDANSETRON 2 MG/ML
4 INJECTION INTRAMUSCULAR; INTRAVENOUS EVERY 6 HOURS PRN
Status: DISCONTINUED | OUTPATIENT
Start: 2023-06-13 | End: 2023-06-13 | Stop reason: HOSPADM

## 2023-06-13 RX ORDER — ACETAMINOPHEN 325 MG/1
650 TABLET ORAL EVERY 6 HOURS PRN
Status: ACTIVE | OUTPATIENT
Start: 2023-06-13 | End: 2023-06-14

## 2023-06-13 RX ORDER — SODIUM CHLORIDE 0.9 % (FLUSH) 0.9 %
10 SYRINGE (ML) INJECTION EVERY 12 HOURS SCHEDULED
Status: DISCONTINUED | OUTPATIENT
Start: 2023-06-13 | End: 2023-06-13 | Stop reason: HOSPADM

## 2023-06-13 RX ORDER — BUTORPHANOL TARTRATE 1 MG/ML
2 INJECTION, SOLUTION INTRAMUSCULAR; INTRAVENOUS
Status: DISCONTINUED | OUTPATIENT
Start: 2023-06-13 | End: 2023-06-13 | Stop reason: HOSPADM

## 2023-06-13 RX ORDER — ONDANSETRON 4 MG/1
4 TABLET, FILM COATED ORAL EVERY 6 HOURS PRN
Status: DISCONTINUED | OUTPATIENT
Start: 2023-06-13 | End: 2023-06-13 | Stop reason: HOSPADM

## 2023-06-13 RX ORDER — DIAZEPAM 5 MG/ML
5 INJECTION, SOLUTION INTRAMUSCULAR; INTRAVENOUS EVERY 4 HOURS PRN
Status: DISCONTINUED | OUTPATIENT
Start: 2023-06-13 | End: 2023-06-13

## 2023-06-13 RX ORDER — CLINDAMYCIN PHOSPHATE 900 MG/50ML
900 INJECTION, SOLUTION INTRAVENOUS EVERY 8 HOURS
Status: COMPLETED | OUTPATIENT
Start: 2023-06-13 | End: 2023-06-13

## 2023-06-13 RX ORDER — KETOROLAC TROMETHAMINE 30 MG/ML
INJECTION, SOLUTION INTRAMUSCULAR; INTRAVENOUS AS NEEDED
Status: DISCONTINUED | OUTPATIENT
Start: 2023-06-13 | End: 2023-06-13 | Stop reason: SURG

## 2023-06-13 RX ORDER — LIDOCAINE HYDROCHLORIDE 20 MG/ML
20 INJECTION, SOLUTION INFILTRATION; PERINEURAL ONCE AS NEEDED
Status: DISCONTINUED | OUTPATIENT
Start: 2023-06-13 | End: 2023-06-13

## 2023-06-13 RX ORDER — DEXAMETHASONE SODIUM PHOSPHATE 4 MG/ML
INJECTION, SOLUTION INTRA-ARTICULAR; INTRALESIONAL; INTRAMUSCULAR; INTRAVENOUS; SOFT TISSUE AS NEEDED
Status: DISCONTINUED | OUTPATIENT
Start: 2023-06-13 | End: 2023-06-13 | Stop reason: SURG

## 2023-06-13 RX ORDER — PROMETHAZINE HYDROCHLORIDE 25 MG/1
25 TABLET ORAL EVERY 6 HOURS PRN
Status: DISCONTINUED | OUTPATIENT
Start: 2023-06-13 | End: 2023-06-15 | Stop reason: HOSPADM

## 2023-06-13 RX ORDER — LIDOCAINE HYDROCHLORIDE 10 MG/ML
5 INJECTION, SOLUTION EPIDURAL; INFILTRATION; INTRACAUDAL; PERINEURAL AS NEEDED
Status: DISCONTINUED | OUTPATIENT
Start: 2023-06-13 | End: 2023-06-13 | Stop reason: HOSPADM

## 2023-06-13 RX ORDER — TRISODIUM CITRATE DIHYDRATE AND CITRIC ACID MONOHYDRATE 500; 334 MG/5ML; MG/5ML
30 SOLUTION ORAL ONCE
Status: COMPLETED | OUTPATIENT
Start: 2023-06-13 | End: 2023-06-13

## 2023-06-13 RX ORDER — IBUPROFEN 600 MG/1
600 TABLET ORAL EVERY 6 HOURS PRN
Status: DISCONTINUED | OUTPATIENT
Start: 2023-06-13 | End: 2023-06-13 | Stop reason: HOSPADM

## 2023-06-13 RX ORDER — TERBUTALINE SULFATE 1 MG/ML
0.25 INJECTION, SOLUTION SUBCUTANEOUS AS NEEDED
Status: DISCONTINUED | OUTPATIENT
Start: 2023-06-13 | End: 2023-06-13 | Stop reason: HOSPADM

## 2023-06-13 RX ORDER — OXYCODONE HYDROCHLORIDE 5 MG/1
10 TABLET ORAL EVERY 4 HOURS PRN
Status: DISCONTINUED | OUTPATIENT
Start: 2023-06-13 | End: 2023-06-15 | Stop reason: HOSPADM

## 2023-06-13 RX ORDER — ONDANSETRON 4 MG/1
4 TABLET, FILM COATED ORAL EVERY 8 HOURS PRN
Status: DISCONTINUED | OUTPATIENT
Start: 2023-06-13 | End: 2023-06-15 | Stop reason: HOSPADM

## 2023-06-13 RX ORDER — OXCARBAZEPINE 300 MG/1
600 TABLET, FILM COATED ORAL 2 TIMES DAILY
Status: DISCONTINUED | OUTPATIENT
Start: 2023-06-13 | End: 2023-06-13

## 2023-06-13 RX ORDER — ONDANSETRON 2 MG/ML
4 INJECTION INTRAMUSCULAR; INTRAVENOUS ONCE AS NEEDED
Status: DISCONTINUED | OUTPATIENT
Start: 2023-06-13 | End: 2023-06-15 | Stop reason: HOSPADM

## 2023-06-13 RX ORDER — FENTANYL CITRATE 50 UG/ML
INJECTION, SOLUTION INTRAMUSCULAR; INTRAVENOUS AS NEEDED
Status: DISCONTINUED | OUTPATIENT
Start: 2023-06-13 | End: 2023-06-13 | Stop reason: SURG

## 2023-06-13 RX ORDER — ACETAMINOPHEN 325 MG/1
650 TABLET ORAL EVERY 6 HOURS
Status: DISCONTINUED | OUTPATIENT
Start: 2023-06-14 | End: 2023-06-15 | Stop reason: HOSPADM

## 2023-06-13 RX ORDER — OXYTOCIN/0.9 % SODIUM CHLORIDE 30/500 ML
250 PLASTIC BAG, INJECTION (ML) INTRAVENOUS CONTINUOUS
Status: ACTIVE | OUTPATIENT
Start: 2023-06-13 | End: 2023-06-13

## 2023-06-13 RX ORDER — OXYCODONE HYDROCHLORIDE 5 MG/1
5 TABLET ORAL EVERY 4 HOURS PRN
Status: DISCONTINUED | OUTPATIENT
Start: 2023-06-13 | End: 2023-06-15 | Stop reason: HOSPADM

## 2023-06-13 RX ORDER — PRENATAL VIT/IRON FUM/FOLIC AC 27MG-0.8MG
1 TABLET ORAL DAILY
Status: DISCONTINUED | OUTPATIENT
Start: 2023-06-13 | End: 2023-06-15 | Stop reason: HOSPADM

## 2023-06-13 RX ORDER — ONDANSETRON 2 MG/ML
4 INJECTION INTRAMUSCULAR; INTRAVENOUS ONCE AS NEEDED
Status: DISCONTINUED | OUTPATIENT
Start: 2023-06-13 | End: 2023-06-13 | Stop reason: HOSPADM

## 2023-06-13 RX ORDER — OXYTOCIN/0.9 % SODIUM CHLORIDE 30/500 ML
999 PLASTIC BAG, INJECTION (ML) INTRAVENOUS ONCE
Status: DISCONTINUED | OUTPATIENT
Start: 2023-06-13 | End: 2023-06-13 | Stop reason: HOSPADM

## 2023-06-13 RX ORDER — SODIUM CHLORIDE 0.9 % (FLUSH) 0.9 %
3 SYRINGE (ML) INJECTION EVERY 12 HOURS SCHEDULED
Status: DISCONTINUED | OUTPATIENT
Start: 2023-06-13 | End: 2023-06-15 | Stop reason: HOSPADM

## 2023-06-13 RX ORDER — CALCIUM CARBONATE 500 MG/1
2 TABLET, CHEWABLE ORAL 3 TIMES DAILY PRN
Status: DISCONTINUED | OUTPATIENT
Start: 2023-06-13 | End: 2023-06-13 | Stop reason: HOSPADM

## 2023-06-13 RX ORDER — ACETAMINOPHEN 500 MG
1000 TABLET ORAL EVERY 6 HOURS
Status: COMPLETED | OUTPATIENT
Start: 2023-06-13 | End: 2023-06-14

## 2023-06-13 RX ORDER — METHYLERGONOVINE MALEATE 0.2 MG/ML
200 INJECTION INTRAVENOUS ONCE AS NEEDED
Status: DISCONTINUED | OUTPATIENT
Start: 2023-06-13 | End: 2023-06-13 | Stop reason: HOSPADM

## 2023-06-13 RX ORDER — METOCLOPRAMIDE HYDROCHLORIDE 5 MG/ML
10 INJECTION INTRAMUSCULAR; INTRAVENOUS EVERY 6 HOURS
Status: DISCONTINUED | OUTPATIENT
Start: 2023-06-13 | End: 2023-06-13

## 2023-06-13 RX ORDER — KETOROLAC TROMETHAMINE 15 MG/ML
15 INJECTION, SOLUTION INTRAMUSCULAR; INTRAVENOUS EVERY 6 HOURS
Status: COMPLETED | OUTPATIENT
Start: 2023-06-13 | End: 2023-06-14

## 2023-06-13 RX ORDER — ACETAMINOPHEN 325 MG/1
650 TABLET ORAL EVERY 4 HOURS PRN
Status: DISCONTINUED | OUTPATIENT
Start: 2023-06-13 | End: 2023-06-13 | Stop reason: HOSPADM

## 2023-06-13 RX ORDER — OXYCODONE AND ACETAMINOPHEN 7.5; 325 MG/1; MG/1
1 TABLET ORAL EVERY 4 HOURS PRN
Status: ACTIVE | OUTPATIENT
Start: 2023-06-13 | End: 2023-06-14

## 2023-06-13 RX ORDER — SIMETHICONE 80 MG
80 TABLET,CHEWABLE ORAL 4 TIMES DAILY PRN
Status: DISCONTINUED | OUTPATIENT
Start: 2023-06-13 | End: 2023-06-15 | Stop reason: HOSPADM

## 2023-06-13 RX ORDER — FAMOTIDINE 10 MG/ML
20 INJECTION, SOLUTION INTRAVENOUS ONCE AS NEEDED
Status: COMPLETED | OUTPATIENT
Start: 2023-06-13 | End: 2023-06-13

## 2023-06-13 RX ORDER — KETOROLAC TROMETHAMINE 30 MG/ML
30 INJECTION, SOLUTION INTRAMUSCULAR; INTRAVENOUS EVERY 6 HOURS PRN
Status: ACTIVE | OUTPATIENT
Start: 2023-06-13 | End: 2023-06-14

## 2023-06-13 RX ORDER — FAMOTIDINE 10 MG/ML
20 INJECTION, SOLUTION INTRAVENOUS EVERY 12 HOURS SCHEDULED
Status: DISCONTINUED | OUTPATIENT
Start: 2023-06-13 | End: 2023-06-13

## 2023-06-13 RX ADMIN — BUTORPHANOL TARTRATE 1 MG: 1 INJECTION, SOLUTION INTRAMUSCULAR; INTRAVENOUS at 08:26

## 2023-06-13 RX ADMIN — HYDROMORPHONE HYDROCHLORIDE 1 MG: 1 INJECTION, SOLUTION INTRAMUSCULAR; INTRAVENOUS; SUBCUTANEOUS at 16:46

## 2023-06-13 RX ADMIN — METOCLOPRAMIDE 10 MG: 5 INJECTION, SOLUTION INTRAMUSCULAR; INTRAVENOUS at 17:12

## 2023-06-13 RX ADMIN — ONDANSETRON 4 MG: 2 INJECTION INTRAMUSCULAR; INTRAVENOUS at 14:59

## 2023-06-13 RX ADMIN — KETOROLAC TROMETHAMINE 30 MG: 30 INJECTION, SOLUTION INTRAMUSCULAR; INTRAVENOUS at 14:59

## 2023-06-13 RX ADMIN — FENTANYL CITRATE 100 MCG: 0.05 INJECTION, SOLUTION INTRAMUSCULAR; INTRAVENOUS at 14:49

## 2023-06-13 RX ADMIN — SODIUM CITRATE AND CITRIC ACID MONOHYDRATE 15 ML: 500; 334 SOLUTION ORAL at 17:12

## 2023-06-13 RX ADMIN — HYDROMORPHONE HYDROCHLORIDE 1 MG: 1 INJECTION, SOLUTION INTRAMUSCULAR; INTRAVENOUS; SUBCUTANEOUS at 15:04

## 2023-06-13 RX ADMIN — CLINDAMYCIN PHOSPHATE 900 MG: 900 INJECTION, SOLUTION INTRAVENOUS at 14:38

## 2023-06-13 RX ADMIN — SODIUM CHLORIDE, POTASSIUM CHLORIDE, SODIUM LACTATE AND CALCIUM CHLORIDE 125 ML/HR: 600; 310; 30; 20 INJECTION, SOLUTION INTRAVENOUS at 12:06

## 2023-06-13 RX ADMIN — SODIUM CHLORIDE, POTASSIUM CHLORIDE, SODIUM LACTATE AND CALCIUM CHLORIDE 1000 ML: 600; 310; 30; 20 INJECTION, SOLUTION INTRAVENOUS at 04:39

## 2023-06-13 RX ADMIN — OXYCODONE HYDROCHLORIDE 5 MG: 5 TABLET ORAL at 20:40

## 2023-06-13 RX ADMIN — BUTORPHANOL TARTRATE 1 MG: 1 INJECTION, SOLUTION INTRAMUSCULAR; INTRAVENOUS at 12:11

## 2023-06-13 RX ADMIN — OXCARBAZEPINE 600 MG: 300 TABLET, FILM COATED ORAL at 07:25

## 2023-06-13 RX ADMIN — ACETAMINOPHEN 1000 MG: 500 TABLET, FILM COATED ORAL at 19:41

## 2023-06-13 RX ADMIN — BUTORPHANOL TARTRATE 1 MG: 1 INJECTION, SOLUTION INTRAMUSCULAR; INTRAVENOUS at 02:00

## 2023-06-13 RX ADMIN — PROPOFOL 200 MG: 10 INJECTION, EMULSION INTRAVENOUS at 14:37

## 2023-06-13 RX ADMIN — PRENATAL VIT W/ FE FUMARATE-FA TAB 27-0.8 MG 1 TABLET: 27-0.8 TAB at 20:40

## 2023-06-13 RX ADMIN — Medication 160 MG: at 14:37

## 2023-06-13 RX ADMIN — IBUPROFEN 600 MG: 600 TABLET, FILM COATED ORAL at 16:46

## 2023-06-13 RX ADMIN — METOCLOPRAMIDE HYDROCHLORIDE 10 MG: 5 INJECTION INTRAMUSCULAR; INTRAVENOUS at 14:07

## 2023-06-13 RX ADMIN — SODIUM CITRATE AND CITRIC ACID MONOHYDRATE 30 ML: 500; 334 SOLUTION ORAL at 17:13

## 2023-06-13 RX ADMIN — KETOROLAC TROMETHAMINE 15 MG: 15 INJECTION, SOLUTION INTRAMUSCULAR; INTRAVENOUS at 22:25

## 2023-06-13 RX ADMIN — LEVETIRACETAM 1500 MG: 500 TABLET, FILM COATED ORAL at 07:25

## 2023-06-13 RX ADMIN — GENTAMICIN SULFATE 367.2 MG: 40 INJECTION, SOLUTION INTRAMUSCULAR; INTRAVENOUS at 14:38

## 2023-06-13 RX ADMIN — FENTANYL CITRATE 150 MCG: 0.05 INJECTION, SOLUTION INTRAMUSCULAR; INTRAVENOUS at 14:57

## 2023-06-13 RX ADMIN — OXYTOCIN 10 UNITS: 10 INJECTION INTRAVENOUS at 14:39

## 2023-06-13 RX ADMIN — FAMOTIDINE 20 MG: 10 INJECTION INTRAVENOUS at 14:07

## 2023-06-13 RX ADMIN — FAMOTIDINE 20 MG: 10 INJECTION INTRAVENOUS at 17:12

## 2023-06-13 RX ADMIN — SODIUM CHLORIDE, POTASSIUM CHLORIDE, SODIUM LACTATE AND CALCIUM CHLORIDE 125 ML/HR: 600; 310; 30; 20 INJECTION, SOLUTION INTRAVENOUS at 01:49

## 2023-06-13 RX ADMIN — SODIUM CITRATE AND CITRIC ACID MONOHYDRATE 30 ML: 500; 334 SOLUTION ORAL at 13:52

## 2023-06-13 RX ADMIN — Medication 100 ML/HR: at 14:57

## 2023-06-13 RX ADMIN — BUTORPHANOL TARTRATE 1 MG: 1 INJECTION, SOLUTION INTRAMUSCULAR; INTRAVENOUS at 05:00

## 2023-06-13 RX ADMIN — DEXAMETHASONE SODIUM PHOSPHATE 8 MG: 4 INJECTION INTRA-ARTICULAR; INTRALESIONAL; INTRAMUSCULAR; INTRAVENOUS; SOFT TISSUE at 14:59

## 2023-06-14 LAB
BASOPHILS # BLD AUTO: 0.03 10*3/MM3 (ref 0–0.2)
BASOPHILS NFR BLD AUTO: 0.2 % (ref 0–1.5)
DEPRECATED RDW RBC AUTO: 44.6 FL (ref 37–54)
EOSINOPHIL # BLD AUTO: 0.01 10*3/MM3 (ref 0–0.4)
EOSINOPHIL NFR BLD AUTO: 0.1 % (ref 0.3–6.2)
ERYTHROCYTE [DISTWIDTH] IN BLOOD BY AUTOMATED COUNT: 13.7 % (ref 12.3–15.4)
HCT VFR BLD AUTO: 26.1 % (ref 34–46.6)
HGB BLD-MCNC: 8 G/DL (ref 12–15.9)
IMM GRANULOCYTES # BLD AUTO: 0.09 10*3/MM3 (ref 0–0.05)
IMM GRANULOCYTES NFR BLD AUTO: 0.5 % (ref 0–0.5)
LAB AP CASE REPORT: NORMAL
LYMPHOCYTES # BLD AUTO: 1.88 10*3/MM3 (ref 0.7–3.1)
LYMPHOCYTES NFR BLD AUTO: 9.9 % (ref 19.6–45.3)
Lab: NORMAL
MCH RBC QN AUTO: 27.5 PG (ref 26.6–33)
MCHC RBC AUTO-ENTMCNC: 30.7 G/DL (ref 31.5–35.7)
MCV RBC AUTO: 89.7 FL (ref 79–97)
MONOCYTES # BLD AUTO: 1.57 10*3/MM3 (ref 0.1–0.9)
MONOCYTES NFR BLD AUTO: 8.3 % (ref 5–12)
NEUTROPHILS NFR BLD AUTO: 15.35 10*3/MM3 (ref 1.7–7)
NEUTROPHILS NFR BLD AUTO: 81 % (ref 42.7–76)
NRBC BLD AUTO-RTO: 0 /100 WBC (ref 0–0.2)
PATH REPORT.FINAL DX SPEC: NORMAL
PATH REPORT.GROSS SPEC: NORMAL
PLATELET # BLD AUTO: 307 10*3/MM3 (ref 140–450)
PMV BLD AUTO: 10.4 FL (ref 6–12)
RBC # BLD AUTO: 2.91 10*6/MM3 (ref 3.77–5.28)
WBC NRBC COR # BLD: 18.93 10*3/MM3 (ref 3.4–10.8)

## 2023-06-14 PROCEDURE — 0503F POSTPARTUM CARE VISIT: CPT | Performed by: ADVANCED PRACTICE MIDWIFE

## 2023-06-14 PROCEDURE — 25010000002 KETOROLAC TROMETHAMINE PER 15 MG: Performed by: OBSTETRICS & GYNECOLOGY

## 2023-06-14 PROCEDURE — 85025 COMPLETE CBC W/AUTO DIFF WBC: CPT | Performed by: OBSTETRICS & GYNECOLOGY

## 2023-06-14 RX ADMIN — OXYCODONE HYDROCHLORIDE 10 MG: 5 TABLET ORAL at 13:12

## 2023-06-14 RX ADMIN — OXYCODONE HYDROCHLORIDE 10 MG: 5 TABLET ORAL at 04:41

## 2023-06-14 RX ADMIN — ACETAMINOPHEN 650 MG: 325 TABLET ORAL at 19:29

## 2023-06-14 RX ADMIN — OXYCODONE HYDROCHLORIDE 10 MG: 5 TABLET ORAL at 09:06

## 2023-06-14 RX ADMIN — OXYCODONE HYDROCHLORIDE 10 MG: 5 TABLET ORAL at 20:02

## 2023-06-14 RX ADMIN — IBUPROFEN 600 MG: 600 TABLET, FILM COATED ORAL at 23:02

## 2023-06-14 RX ADMIN — ACETAMINOPHEN 1000 MG: 500 TABLET, FILM COATED ORAL at 13:09

## 2023-06-14 RX ADMIN — ACETAMINOPHEN 1000 MG: 500 TABLET, FILM COATED ORAL at 06:40

## 2023-06-14 RX ADMIN — ACETAMINOPHEN 1000 MG: 500 TABLET, FILM COATED ORAL at 01:13

## 2023-06-14 RX ADMIN — PRENATAL VIT W/ FE FUMARATE-FA TAB 27-0.8 MG 1 TABLET: 27-0.8 TAB at 08:41

## 2023-06-14 RX ADMIN — KETOROLAC TROMETHAMINE 15 MG: 15 INJECTION, SOLUTION INTRAMUSCULAR; INTRAVENOUS at 04:41

## 2023-06-14 RX ADMIN — IBUPROFEN 600 MG: 600 TABLET, FILM COATED ORAL at 17:00

## 2023-06-15 ENCOUNTER — PATIENT OUTREACH (OUTPATIENT)
Dept: LABOR AND DELIVERY | Facility: HOSPITAL | Age: 18
End: 2023-06-15
Payer: COMMERCIAL

## 2023-06-15 VITALS
HEART RATE: 97 BPM | TEMPERATURE: 98.5 F | BODY MASS INDEX: 35.96 KG/M2 | DIASTOLIC BLOOD PRESSURE: 73 MMHG | HEIGHT: 65 IN | OXYGEN SATURATION: 97 % | SYSTOLIC BLOOD PRESSURE: 131 MMHG | WEIGHT: 215.8 LBS | RESPIRATION RATE: 20 BRPM

## 2023-06-15 RX ORDER — ACETAMINOPHEN 325 MG/1
650 TABLET ORAL EVERY 6 HOURS PRN
Start: 2023-06-15

## 2023-06-15 RX ORDER — IBUPROFEN 200 MG
400 TABLET ORAL EVERY 4 HOURS PRN
Start: 2023-06-15

## 2023-06-15 RX ORDER — OXYCODONE HYDROCHLORIDE 5 MG/1
5 TABLET ORAL EVERY 6 HOURS PRN
Qty: 12 TABLET | Refills: 0 | Status: SHIPPED | OUTPATIENT
Start: 2023-06-15

## 2023-06-15 RX ADMIN — IBUPROFEN 600 MG: 600 TABLET, FILM COATED ORAL at 04:23

## 2023-06-15 RX ADMIN — OXYCODONE HYDROCHLORIDE 10 MG: 5 TABLET ORAL at 08:10

## 2023-06-15 RX ADMIN — ACETAMINOPHEN 650 MG: 325 TABLET ORAL at 08:10

## 2023-06-15 RX ADMIN — OXYCODONE HYDROCHLORIDE 10 MG: 5 TABLET ORAL at 00:03

## 2023-06-15 RX ADMIN — ACETAMINOPHEN 650 MG: 325 TABLET ORAL at 01:52

## 2023-08-02 ENCOUNTER — POSTPARTUM VISIT (OUTPATIENT)
Dept: OBSTETRICS AND GYNECOLOGY | Facility: CLINIC | Age: 18
End: 2023-08-02
Payer: COMMERCIAL

## 2023-08-02 VITALS
BODY MASS INDEX: 27.99 KG/M2 | HEIGHT: 65 IN | SYSTOLIC BLOOD PRESSURE: 116 MMHG | WEIGHT: 168 LBS | DIASTOLIC BLOOD PRESSURE: 74 MMHG

## 2024-06-25 ENCOUNTER — TELEPHONE (OUTPATIENT)
Dept: OBSTETRICS AND GYNECOLOGY | Age: 19
End: 2024-06-25

## 2024-06-28 ENCOUNTER — OFFICE VISIT (OUTPATIENT)
Dept: OBSTETRICS AND GYNECOLOGY | Age: 19
End: 2024-06-28
Payer: COMMERCIAL

## 2024-06-28 VITALS
BODY MASS INDEX: 27.52 KG/M2 | WEIGHT: 165.2 LBS | HEIGHT: 65 IN | DIASTOLIC BLOOD PRESSURE: 74 MMHG | SYSTOLIC BLOOD PRESSURE: 126 MMHG

## 2024-06-28 DIAGNOSIS — O03.9 MISCARRIAGE: ICD-10-CM

## 2024-06-28 DIAGNOSIS — Z31.69 ENCOUNTER FOR PRECONCEPTION CONSULTATION: Primary | ICD-10-CM

## 2024-06-28 RX ORDER — LEVETIRACETAM 750 MG/1
750 TABLET ORAL 2 TIMES DAILY
COMMUNITY

## (undated) DEVICE — SUT MNCRYL 4/0 PS2 27IN UD MCP426H

## (undated) DEVICE — APPL CHLORAPREP HI/LITE 26ML ORNG

## (undated) DEVICE — GLV SURG SENSICARE SLT PF LF 6 STRL

## (undated) DEVICE — PAD C-SECTION: Brand: MEDLINE INDUSTRIES, INC.

## (undated) DEVICE — SUT VIC 0 CT1 36IN J946H

## (undated) DEVICE — KENDALL SCD EXPRESS SLEEVES, KNEE LENGTH, LARGE: Brand: KENDALL SCD

## (undated) DEVICE — TBG PENCL TELESCP MEGADYNE SMOKE EVAC 10FT

## (undated) DEVICE — ADHS SKIN PREMIERPRO EXOFIN TOPICAL HI/VISC .5ML

## (undated) DEVICE — CVR HNDL LIGHT RIGID

## (undated) DEVICE — SAFEPICO ASPIRATOR ARTERIAL BLOOD SAMPLER. ASPIRATOR ONLY WITH VENTED TIPCAP, 1.5 ML, BOX OF 100 PCS.: Brand: SAFEPICO ASPIRATOR

## (undated) DEVICE — Device

## (undated) DEVICE — PATIENT RETURN ELECTRODE, SINGLE-USE, CONTACT QUALITY MONITORING, ADULT, WITH 15 FT (4.5 M) CORD. FOR PATIENTS WEIGHING OVER 33LBS. (15KG): Brand: MEGADYNE

## (undated) DEVICE — LARGE, DISPOSABLE C-SECTION RETRACTOR: Brand: ALEXIS ® O C-SECTION PROTECTOR/RETRACTOR

## (undated) DEVICE — ANTIBACTERIAL VIOLET BRAIDED (POLYGLACTIN 910), SYNTHETIC ABSORBABLE SUTURE: Brand: COATED VICRYL